# Patient Record
Sex: FEMALE | Race: WHITE
[De-identification: names, ages, dates, MRNs, and addresses within clinical notes are randomized per-mention and may not be internally consistent; named-entity substitution may affect disease eponyms.]

---

## 2018-11-11 ENCOUNTER — HOSPITAL ENCOUNTER (EMERGENCY)
Dept: HOSPITAL 80 - FED | Age: 23
Discharge: HOME | End: 2018-11-11
Payer: COMMERCIAL

## 2018-11-11 VITALS — SYSTOLIC BLOOD PRESSURE: 112 MMHG | DIASTOLIC BLOOD PRESSURE: 67 MMHG

## 2018-11-11 DIAGNOSIS — S31.811A: Primary | ICD-10-CM
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
## 2018-11-11 PROCEDURE — 0HQ8XZZ REPAIR BUTTOCK SKIN, EXTERNAL APPROACH: ICD-10-PCS

## 2018-11-11 NOTE — EDPHY
H & P


Stated Complaint: fall off tailgate onto concrete, cut to right buttock, still 

bleeding


Source: Patient


Exam Limitations: No limitations





- Personal History


LMP (Females 10-55): Now


Current Tetanus/Diphtheria Vaccine: Yes





- Medical/Surgical History


Hx Asthma: Yes


Hx Chronic Respiratory Disease: No


Hx Diabetes: No


Hx Cardiac Disease: No


Hx Renal Disease: No


Hx Cirrhosis: No


Hx Alcoholism: No


Hx HIV/AIDS: No


Hx Splenectomy or Spleen Trauma: No


Other PMH: asthma





- Social History


Smoking Status: Never smoked


Time Seen by Provider: 11/11/18 01:07


HPI/ROS: 


HPI:  This is a 23-year-old female who presents with





Chief Complaint: fall off tailgate onto concrete, cut to right buttock, still 

bleeding





Location:  Right buttock


Quality:  Laceration


Duration:  1 hr prior to arrival


Signs and Symptoms:  + bleeding, no radiation, no numbness, no weakness, no 

tingling, no incontinence, no decreased range of motion, no swelling, no pain, 

no fever


Timing:  Acute


Severity:  Mild


Context:  Patient is here visiting her cousin from [place] for the weekend 

presents with accidentally falling off the tailgate onto concrete, she 

sustained of right buttock laceration.  They washed with soap and water but 

continued to bleed. Denies LOC/head injury/neck pain/dizziness/nausea/vomiting/

amnesia.  Tetanus is current.


Modifying Factors:  See above





Comment: 








ROS:  A comprehensive 10 system review of systems is otherwise negative aside 

from elements mentioned in the history of present illness. 








MEDICAL/SURGICAL/SOCIAL HISTORY: 


Medical history:  Generally healthy.  Does not take any regular medications.


Surgical history:  Denies


Social history:  Student at [employer].  Denies alcohol, 

drug, tobacco use.








CONSTITUTIONAL:  Polite and cooperative, young adult white female, awake and 

alert, no obvious distress


HEENT: Atraumatic and normocephalic.


NECK: supple, no midline tenderness, flexion 45 degrees, extension 45 degrees, 

right and left lateral flexion 45 degrees. No meningismus.


Buttock:  Right buttock shows 0.5 in, superficial, linear, simple laceration.


EXTREMITIES:  2/2 pulses, strength 5/5, DIP/PIP/MCP flexion/extension intact 

with good light touch sensation. no deformities, no clubbing, no cyanosis or 

edema.


NEUROLOGICAL: no focal neuro deficits.  GCS 15.  Light touch sensation intact.


SKIN: Warm and dry, no erythema. no rash.  Good capillary refill.   


 (Lesvia Bullard)


Constitutional: 





 Initial Vital Signs











Temperature (C)  36.4 C   11/11/18 00:51


 


Heart Rate  87   11/11/18 00:51


 


Respiratory Rate  18   11/11/18 00:51


 


Blood Pressure  109/71   11/11/18 00:51


 


O2 Sat (%)  96   11/11/18 00:51








 











O2 Delivery Mode               Room Air














Allergies/Adverse Reactions: 


 





No Known Allergies Allergy (Unverified 11/11/18 00:50)


 








Home Medications: 














 Medication  Instructions  Recorded


 


Albuterol  11/11/18


 


Junel Fe 24 Tablet  11/11/18














Medical Decision Making


Procedures: 


Procedure:  Laceration repair.





Verbal consent was obtained from the patient.  The 1.5 in, linear, superficial 

laceration on the right buttock was anesthetized in the usual fashion for mL of 

1% lidocaine with epinephrine.  The wound was irrigated, draped and explored to 

its base with a gloved finger.  There were no deep structures involved.  No 

tendon injury was identified.  The wound was repaired with #2, 4 0 Vicryl 

horizontal buried.  Steri-Strips and Mastisol applied. The procedure was 

performed by myself.


 (Lesvia Bullard)


ED Course/Re-evaluation: 


Tetanus status is current.


Local anesthesia provided; irrigated copiously


Laceration closed with buried absorbable sutures.


Steri-Strips applied. 


Verbal and written wound care instructions provided.





No signs of neurovascular compromise/tenting of skin/compartment syndrome/

extremities and joints examined above and below area of concern and are 

neurovascularly intact.





This patient was seen under the supervision of my secondary supervising 

physician.  I evaluated care for this patient independently.  Discussed this 

patient with Dr. Rudd.    


 (Lesvia Bullard)





PHYSICIAN DOCUMENTATION:


The patient was evaluated and managed by the Physician Assistant.  My co-

signature indicates that I have reviewed this chart and I agree with the 

findings and plan of care as documented.  I am the secondary supervising 

physician.


 (Becky Rudd)


Differential Diagnosis: 


Differential diagnosis includes but is not limited to contusion, abrasion, 

laceration.


 (Lesvia Bullard)





Departure





- Departure


Disposition: Home, Routine, Self-Care


Clinical Impression: 


Laceration of right buttock without foreign body


Qualifiers:


 Encounter type: initial encounter Qualified Code(s): S31.811A - Laceration 

without foreign body of right buttock, initial encounter





Condition: Good


Instructions:  Laceration (ED), Care For Your Absorbable Stitches (ED), 

Steristrips (ED)


Additional Instructions: 


Keep the dressing dry and in place for 48 hours.


After 48 hours, you may wash the site daily with mild soap and water; then pat 

dry. 


Take Tylenol 650 mg every 4 hours and/or Ibuprofen 600 mg every 8 hours with 

food as needed for pain. 


Your laceration today was closed with absorbable sutures.  These will slowly 

dissolve over time and do not need to be removed.


Allow the Steri-Strips to fall off on their own. 




















Referrals: 


[facility] [Other] - As per Instructions